# Patient Record
Sex: MALE | Race: WHITE | NOT HISPANIC OR LATINO | Employment: STUDENT | URBAN - METROPOLITAN AREA
[De-identification: names, ages, dates, MRNs, and addresses within clinical notes are randomized per-mention and may not be internally consistent; named-entity substitution may affect disease eponyms.]

---
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
## 2023-02-28 ENCOUNTER — TELEPHONE (OUTPATIENT)
Age: 18
End: 2023-02-28

## 2023-03-29 NOTE — TELEPHONE ENCOUNTER
03/29/23 10:57 AM     The office's request has been received, reviewed, and the patient chart updated  The PCP has successfully been removed with a patient attribution note  This message will now be completed      Thank you  Sonido Browne

## 2023-07-26 NOTE — CONSULTS
I had the pleasure of evaluating your patient, Lacey Double  My full evaluation follows:      Chief Complaint  Cyclic vomiting syndrome      History of Present Illness  Ronni Blanca was seen today in follow-up in the GI office regarding cyclic vomiting syndrome  Since last visit we perform some metabolic studies that were negative  An upper GI revealed some gastroesophageal reflux but no anatomic defects were noted  Since then, he has been taking Co Q10 on a daily basis and had no episodes over the summer  After school began, he has had 3 episodes and missed 6 days of school so far, more than I am comfortable with  The episodes are not as protracted nor as severe as they were prior to the initiation of Co Q10  Review of Systems    Constitutional: recent 3 lb weight gain, but not feeling poorly and not feeling tired  ENT: no nosebleeds and no nasal discharge  Cardiovascular: no chest pain and no palpitations  Respiratory: no cough and no wheezing  Gastrointestinal: vomiting and 3 episodes of vomiting since school has started, but no abdominal pain, no nausea, no constipation and no diarrhea  Genitourinary: no dysuria  Musculoskeletal: no arthralgias  Integumentary: no rashes  Neurological: no headache  ROS reviewed  Active Problems    1  Abnormal hearing test (389 9) (Z01 118,R94 128)   2  Abnormal thyroid stimulating hormone (TSH) level (790 6) (R94 6)   3  Cyclical vomiting with nausea, intractability of vomiting not specified (536 2) (G43  A0)   4  Intermittent abdominal pain (789 00) (R10 9)   5  Intermittent diarrhea (787 91) (R19 7)   6  Juvenile idiopathic scoliosis of thoracolumbar region (737 30) (M41 115)   7  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)   8   Need for influenza vaccination (V04 81) (Z23)    Past Medical History    · History of Acute maxillary sinusitis, recurrence not specified (461 0) (J01 00)   · History of Acute right otitis media (382 9) (H66 91)   · History of Closed Plastic Surgery Daily Progress Note  Patient name: Trell Puente  Patient age: 19 year old  MRN: [id number]  YOB: 2003  Admit date: 7/18/2023  Current Hospital Day: 9      Patient seen and examined. Laying comfortably in bed, denies any pain or nausea. Normal BM.    No events overnight.  No complaints.  No CP/SOB.  Pain controlled.    Temp:  [98.1 °F (36.7 °C)-98.8 °F (37.1 °C)] 98.2 °F (36.8 °C)  Heart Rate:  [72-86] 84  Resp:  [12-18] 12  BP: (106-118)/(61-73) 106/66      Intake/Output:  Date 07/25/23 0700 - 07/26/23 0659 07/26/23 0700 - 07/27/23 0659   Shift 3948-6923 2062-5871 5215-0443 24 Hour Total 3592-4262 3255-5619 8017-3554 24 Hour Total   INTAKE   Shift Total           OUTPUT   Urine 750 7030 369 9753       Drains 110 80 50 240 50   50     Output (ml) (Drain 07/24/23 Left Bulb (e.g. KRISHNA, Davol, etc)) 110 80 50 240 50   50   Shift Total 860 5313 282 3263 50   50   Weight (kg) 98.7 98.7 98.7 98.7 98.7 98.7 98.7 98.7          Exam:  General: NAD  Chest: Non labored breathing  Abdomen: Soft, NT  Extremities: LLE wrapped in ACE wrap, c/d/i   Wound vac with appropriate seal  KRISHNA drain x1 ssg    Medications:   Current Facility-Administered Medications   Medication Dose Route Frequency Provider Last Rate Last Admin   • enoxaparin (LOVENOX) injection 30 mg  30 mg Subcutaneous Q12H Everardo Moreno MD   30 mg at 07/26/23 1055   • ceFEPIme (MAXIPIME) 2,000 mg in sodium chloride 0.9 % 100 mL IVPB  2,000 mg Intravenous 3 times per day Gabo Miguel MD 25 mL/hr at 07/26/23 1332 2,000 mg at 07/26/23 1332   • cholecalciferol (VITAMIN D) tablet 4,000 Units  4,000 Units Oral Daily Jhonny Ricci MD   4,000 Units at 07/26/23 0825   • acetaminophen (TYLENOL) tablet 1,000 mg  1,000 mg Oral Q6H Eusebio Valentin DO   1,000 mg at 07/26/23 1329   • [Held by provider] losartan (COZAAR) tablet 25 mg  25 mg Oral Daily Eusebio Valentin DO   25 mg at 07/21/23 0909   • [Held by provider] spironolactone (ALDACTONE)  nondisplaced fracture of proximal phalanx of lesser toe of right foot,  initial encounter (826 0) (S92 514A)   · History of Failure to thrive (child) (783 41) (R62 51)   · History of Flu vaccine need (V04 81) (Z23)   · History of constipation (V12 79) (Z87 19)   · 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)   · History of fever (V13 89) (Z57 494)   · History of lymphadenopathy (V13 89) (C82 073)   · History of lymphadenopathy (V13 89) (D32 083)   · History of molluscum contagiosum (V12 00) (Z86 19)   · History of nasal congestion (V12 69) (Z87 09)   · History of Need for influenza vaccination (V04 81) (Z23)   · History of Tonsil, abscess (475) (L19)    Surgical History    · History of Biopsy Of Soft Tissue Of The Neck   · History of Indwelling Catheter PICC Line   · History of Percutaneous Placement Of Gastrostomy Tube    The surgical history was reviewed and updated today  Family History    · Family history of No Significant Family History    · Family history of ulcerative colitis (V18 59) (Z83 79)    · Family history of cardiac disorder (V17 49) (Z82 49)   · Family history of malignant neoplasm of breast (V16 3) (Z80 3)    · FH: colon cancer (V16 0) (Z80 0)    · Family history of Diverticulosis   · Family history of lung cancer (V16 1) (Z80 1)    The family history was reviewed and updated today  Social History    · Currently in 9th grade   · Lives with parents   · Musical instrument   · No alcohol use   · Non-smoker (V49 89) (Z78 9)   · History of Recreational Activities Bicycling  The social history was reviewed and updated today  Current Meds   1  CoQ10 200 MG Oral Capsule; TAKE AS DIRECTED; Therapy: 03MNI2094 to (Last Rx:[id number]) Ordered   2  Mometasone Furoate 50 MCG/ACT Nasal Suspension (Nasonex); 2 sprays each nostril   once a day; Therapy: [id number] to (Last Rx:[id number])  Requested for: 26Eqd2948 Ordered    The medication list was reviewed and updated today  Allergies    1  tablet 12.5 mg  12.5 mg Oral Daily Eusebio Valentin DO   12.5 mg at 07/21/23 0909   • metoPROLOL succinate (TOPROL-XL) ER tablet 12.5 mg  12.5 mg Oral Nightly Eusebio Valentin DO   12.5 mg at 07/25/23 2036   • sodium chloride (PF) 0.9 % injection 2 mL  2 mL Intracatheter 2 times per day Charla Neal MD   2 mL at 07/26/23 0825       Labs:  Recent Labs     07/25/23  0513 07/26/23  0521   RBC 2.69* 2.57*   WBC 5.7 5.8   HCT 22.3* 22.2*   HGB 7.6* 7.5*              S/p L thigh I&D, L tibia I&D, wound vac exchange, 2 Days Post-Op    - discussed moving forward with free flap to LLE on Monday 7/31  - NPO after midnight 7/31  - hold lovenox the morning of 7/31  - continue abx  - activity per ortho  - all questions answered    Joaquina Gibson PA-C  Plastic Surgery Physician Assistant  [facility]    Patient seen and examined with Dr. Neo Bey.     Blood Sets MISC   2  Tegaderm MISC    3  Other    Vitals   Recorded: 30UIP9961 04:09PM   Temperature 96 3 F, Tympanic   Systolic 98   Diastolic 60   Height 053 cm   Weight 45 1 kg   BMI Calculated 17 18   BSA Calculated 1 45   BMI Percentile 17 %   2-20 Stature Percentile 36 %   2-20 Weight Percentile 23 %     Physical Exam    Constitutional - General appearance: No acute distress, well appearing and well nourished  Pulmonary - Respiratory effort: Normal respiratory rate and rhythm, no increased work of breathing  Auscultation of lungs: Clear bilaterally  Cardiovascular - Auscultation of heart: Regular rate and rhythm, normal S1 and S2, no murmur  Chest - Chest: Normal    Abdomen - Abdomen: Normal bowel sounds, soft, non-tender, no masses  Liver and spleen: No hepatomegaly or splenomegaly  Results/Data    Results   Other urine organic acids, amino acids, carnitine were normal  Upper GI series revealed some reflux  Assessment    1  Cyclical vomiting with nausea, intractability of vomiting not specified (536 2) (G43  A0)    Plan  Abnormal hearing test, Cyclical vomiting with nausea, intractability of vomiting not  specified    · Follow-up visit in 2 months Evaluation and Treatment  Follow-up  Status: Hold For -  Scheduling  Requested for: [id number]   Ordered; For: Abnormal hearing test, Cyclical vomiting with nausea, intractability of vomiting not specified; Ordered By: Niecy Thibodeaux Performed:  Due: 06AIS3928  Cyclical vomiting with nausea, intractability of vomiting not specified    · Cyproheptadine HCl - 4 MG Oral Tablet; TAKE 1 TABLET AT BEDTIME   Rx By: Niecy Thibodeaux; Dispense: 30 Days ; #:30 Tablet;  Refill: 3; For: Cyclical vomiting with nausea, intractability of vomiting not specified; KIRSTEN = N; Sent To: [facility]  Cyclical vomiting with nausea, intractability of vomiting not specified, Intermittent  abdominal pain    · CoQ10 200 MG Oral Capsule; TAKE AS DIRECTED   Rx By:

## 2023-08-27 PROBLEM — E03.9 ACQUIRED HYPOTHYROIDISM: Status: ACTIVE | Noted: 2020-09-11

## 2023-08-27 RX ORDER — CLONAZEPAM 0.25 MG/1
TABLET, ORALLY DISINTEGRATING ORAL
Qty: 60 TABLET | Refills: 0 | Status: CANCELLED
Start: 2023-08-27

## 2023-09-01 ENCOUNTER — OFFICE VISIT (OUTPATIENT)
Dept: FAMILY MEDICINE CLINIC | Facility: CLINIC | Age: 18
End: 2023-09-01
Payer: COMMERCIAL

## 2023-09-01 VITALS
TEMPERATURE: 97.6 F | HEART RATE: 75 BPM | RESPIRATION RATE: 14 BRPM | DIASTOLIC BLOOD PRESSURE: 70 MMHG | SYSTOLIC BLOOD PRESSURE: 116 MMHG | HEIGHT: 65 IN | BODY MASS INDEX: 23.72 KG/M2 | OXYGEN SATURATION: 99 % | WEIGHT: 142.38 LBS

## 2023-09-01 DIAGNOSIS — R11.15 NON-INTRACTABLE CYCLICAL VOMITING WITH NAUSEA: ICD-10-CM

## 2023-09-01 DIAGNOSIS — G43.009 MIGRAINE WITHOUT AURA AND WITHOUT STATUS MIGRAINOSUS, NOT INTRACTABLE: ICD-10-CM

## 2023-09-01 DIAGNOSIS — E07.9 THYROID DISORDER: Primary | ICD-10-CM

## 2023-09-01 DIAGNOSIS — Z85.830 HISTORY OF EWING'S SARCOMA: ICD-10-CM

## 2023-09-01 DIAGNOSIS — R53.83 OTHER FATIGUE: ICD-10-CM

## 2023-09-01 DIAGNOSIS — Z13.89 SCREENING FOR CARDIOVASCULAR, RESPIRATORY, AND GENITOURINARY DISEASES: ICD-10-CM

## 2023-09-01 DIAGNOSIS — Z13.1 SCREENING FOR DIABETES MELLITUS (DM): ICD-10-CM

## 2023-09-01 DIAGNOSIS — Z13.83 SCREENING FOR CARDIOVASCULAR, RESPIRATORY, AND GENITOURINARY DISEASES: ICD-10-CM

## 2023-09-01 DIAGNOSIS — Z13.6 SCREENING FOR CARDIOVASCULAR, RESPIRATORY, AND GENITOURINARY DISEASES: ICD-10-CM

## 2023-09-01 PROBLEM — C41.9: Status: ACTIVE | Noted: 2023-09-01

## 2023-09-01 PROBLEM — R79.89 ABNORMAL TSH: Status: RESOLVED | Noted: 2020-09-11 | Resolved: 2023-09-01

## 2023-09-01 PROBLEM — R09.82 POST-NASAL DRIP: Status: RESOLVED | Noted: 2020-12-21 | Resolved: 2023-09-01

## 2023-09-01 PROBLEM — Z00.129 ENCOUNTER FOR WELL CHILD VISIT AT 17 YEARS OF AGE: Status: RESOLVED | Noted: 2020-12-21 | Resolved: 2023-09-01

## 2023-09-01 PROBLEM — F41.9 ANXIETY: Status: RESOLVED | Noted: 2018-01-26 | Resolved: 2023-09-01

## 2023-09-01 PROCEDURE — 99204 OFFICE O/P NEW MOD 45 MIN: CPT | Performed by: FAMILY MEDICINE

## 2023-09-01 NOTE — PROGRESS NOTES
Assessment/Plan:    No problem-specific Assessment & Plan notes found for this encounter. Fatigue, hx of presumed hypothyroidism  Check tsh, t3, tpo  Advised medication and monitoring if hypothyroidism but refer if hyperthyroidism    Hx ewings sarcoma and right TM deformity  Surveillance at [facility]  Could offer local oncology f/u since adult    Migraine stable  Can call for imitrex NS prn  zofran prn nausea  Prophylaxis if needed based on frequency     Diagnoses and all orders for this visit:    Thyroid disorder  -     TSH, 3rd generation; Future  -     T4, free; Future  -     Anti-microsomal antibody; Future  -     TSH, 3rd generation  -     T4, free  -     Anti-microsomal antibody    Non-intractable cyclical vomiting with nausea  Comments:  related to migraine    Screening for cardiovascular, respiratory, and genitourinary diseases  -     Lipid Panel with Direct LDL reflex; Future  -     Lipid Panel with Direct LDL reflex    Screening for diabetes mellitus (DM)  -     Comprehensive metabolic panel; Future  -     Comprehensive metabolic panel    Other fatigue  -     CBC and differential; Future  -     CBC and differential    History of Shahid's sarcoma    Migraine without aura and without status migrainosus, not intractable        No follow-ups on file. Subjective:      Patient ID: Dominique Luna is a 21 y.o. male.     Chief Complaint   Patient presents with   • Establish Care     Lw cma   • blood work     For thyroid low energy lw cma       HPI  ewings sarcoma age 4, chemo and radiation  Right jaw area  Had radiation  Follows at [facility]    Migraine hx in high school, neurology in past, prn meds helped, no daily meds  Got better when moved to [place]  zofran tolerated in past prn nausea    No ativan or klonopin anymore, was used for chemo related anxiety    No meds currently    high tsh in past  Saw endo locally at that time  Given medication  Age 10 at time  Stopped meds on own when moved  Lately notes fatigue    No fam of thyroid issues  F has DM2  M trigeminal neuralgia    The following portions of the patient's history were reviewed and updated as appropriate: allergies, current medications, past family history, past medical history, past social history, past surgical history and problem list.    Review of Systems   Constitutional: Positive for fatigue. Negative for chills, fever and unexpected weight change. Current Outpatient Medications   Medication Sig Dispense Refill   • SUMAtriptan (IMITREX) 20 MG/ACT nasal spray 1 spray (20 mg total) into each nostril every 2 (two) hours as needed for migraine Each spray 5 mg (Patient not taking: Reported on 4/9/2021) 1 Inhaler 1     No current facility-administered medications for this visit. Objective:    /70   Pulse 75   Temp 97.6 °F (36.4 °C) (Tympanic)   Resp 14   Ht 5' 5.25" (1.657 m)   Wt 64.6 kg (142 lb 6 oz)   SpO2 99%   BMI 23.51 kg/m²        Physical Exam  Vitals and nursing note reviewed. Constitutional:       General: He is not in acute distress. Appearance: He is well-developed. He is not ill-appearing. HENT:      Head: Normocephalic. Right Ear: External ear normal.      Left Ear: Tympanic membrane and external ear normal.      Ears:      Comments: Right TM atypical  Eyes:      General: No scleral icterus. Conjunctiva/sclera: Conjunctivae normal.   Neck:      Thyroid: No thyromegaly. Cardiovascular:      Rate and Rhythm: Normal rate and regular rhythm. Heart sounds: No murmur heard. Pulmonary:      Effort: Pulmonary effort is normal. No respiratory distress. Breath sounds: No wheezing. Abdominal:      General: There is no distension. Palpations: Abdomen is soft. Tenderness: There is no abdominal tenderness. Musculoskeletal:         General: No deformity. Cervical back: Neck supple. No rigidity or tenderness. Right lower leg: No edema. Left lower leg: No edema.    Lymphadenopathy: Cervical: No cervical adenopathy. Skin:     General: Skin is warm and dry. Coloration: Skin is not pale. Neurological:      Mental Status: He is alert. Motor: No weakness. Gait: Gait normal.   Psychiatric:         Mood and Affect: Mood normal.         Behavior: Behavior normal.         Thought Content:  Thought content normal.           Sophie Cabello DO

## 2023-09-06 LAB
ALBUMIN SERPL-MCNC: 5 G/DL (ref 4.3–5.2)
ALBUMIN/GLOB SERPL: 2.2 {RATIO} (ref 1.2–2.2)
ALP SERPL-CCNC: 96 IU/L (ref 51–125)
ALT SERPL-CCNC: 20 IU/L (ref 0–44)
AST SERPL-CCNC: 15 IU/L (ref 0–40)
BASOPHILS # BLD AUTO: 0 X10E3/UL (ref 0–0.2)
BASOPHILS NFR BLD AUTO: 1 %
BILIRUB SERPL-MCNC: 0.4 MG/DL (ref 0–1.2)
BUN SERPL-MCNC: 13 MG/DL (ref 6–20)
BUN/CREAT SERPL: 13 (ref 9–20)
CALCIUM SERPL-MCNC: 10.1 MG/DL (ref 8.7–10.2)
CHLORIDE SERPL-SCNC: 100 MMOL/L (ref 96–106)
CHOLEST SERPL-MCNC: 222 MG/DL (ref 100–199)
CO2 SERPL-SCNC: 25 MMOL/L (ref 20–29)
CREAT SERPL-MCNC: 1.03 MG/DL (ref 0.76–1.27)
EGFR: 107 ML/MIN/1.73
EOSINOPHIL # BLD AUTO: 0.4 X10E3/UL (ref 0–0.4)
EOSINOPHIL NFR BLD AUTO: 5 %
ERYTHROCYTE [DISTWIDTH] IN BLOOD BY AUTOMATED COUNT: 13 % (ref 11.6–15.4)
GLOBULIN SER-MCNC: 2.3 G/DL (ref 1.5–4.5)
GLUCOSE SERPL-MCNC: 86 MG/DL (ref 70–99)
HCT VFR BLD AUTO: 44.9 % (ref 37.5–51)
HDLC SERPL-MCNC: 51 MG/DL
HGB BLD-MCNC: 15.4 G/DL (ref 13–17.7)
IMM GRANULOCYTES # BLD: 0 X10E3/UL (ref 0–0.1)
IMM GRANULOCYTES NFR BLD: 0 %
LDLC SERPL CALC-MCNC: 142 MG/DL (ref 0–99)
LDLC/HDLC SERPL: 2.8 RATIO (ref 0–3.6)
LYMPHOCYTES # BLD AUTO: 2.2 X10E3/UL (ref 0.7–3.1)
LYMPHOCYTES NFR BLD AUTO: 30 %
MCH RBC QN AUTO: 31 PG (ref 26.6–33)
MCHC RBC AUTO-ENTMCNC: 34.3 G/DL (ref 31.5–35.7)
MCV RBC AUTO: 91 FL (ref 79–97)
MICRODELETION SYND BLD/T FISH: NORMAL
MONOCYTES # BLD AUTO: 0.6 X10E3/UL (ref 0.1–0.9)
MONOCYTES NFR BLD AUTO: 8 %
NEUTROPHILS # BLD AUTO: 4.2 X10E3/UL (ref 1.4–7)
NEUTROPHILS NFR BLD AUTO: 56 %
PLATELET # BLD AUTO: 402 X10E3/UL (ref 150–450)
POTASSIUM SERPL-SCNC: 4.4 MMOL/L (ref 3.5–5.2)
PROT SERPL-MCNC: 7.3 G/DL (ref 6–8.5)
RBC # BLD AUTO: 4.96 X10E6/UL (ref 4.14–5.8)
SL AMB VLDL CHOLESTEROL CALC: 29 MG/DL (ref 5–40)
SODIUM SERPL-SCNC: 139 MMOL/L (ref 134–144)
T4 FREE SERPL-MCNC: 1.2 NG/DL (ref 0.82–1.77)
THYROPEROXIDASE AB SERPL-ACNC: <9 IU/ML (ref 0–34)
TRIGL SERPL-MCNC: 162 MG/DL (ref 0–149)
TSH SERPL DL<=0.005 MIU/L-ACNC: 10.6 UIU/ML (ref 0.45–4.5)
WBC # BLD AUTO: 7.4 X10E3/UL (ref 3.4–10.8)

## 2023-09-07 PROBLEM — E03.8 OTHER SPECIFIED HYPOTHYROIDISM: Status: ACTIVE | Noted: 2023-09-01

## 2023-09-15 ENCOUNTER — OFFICE VISIT (OUTPATIENT)
Dept: FAMILY MEDICINE CLINIC | Facility: CLINIC | Age: 18
End: 2023-09-15
Payer: COMMERCIAL

## 2023-09-15 VITALS
WEIGHT: 138.38 LBS | HEIGHT: 65 IN | TEMPERATURE: 97.2 F | HEART RATE: 69 BPM | BODY MASS INDEX: 23.06 KG/M2 | SYSTOLIC BLOOD PRESSURE: 122 MMHG | DIASTOLIC BLOOD PRESSURE: 70 MMHG | RESPIRATION RATE: 14 BRPM

## 2023-09-15 DIAGNOSIS — Z85.830 HISTORY OF EWING'S SARCOMA: ICD-10-CM

## 2023-09-15 DIAGNOSIS — R53.83 OTHER FATIGUE: ICD-10-CM

## 2023-09-15 DIAGNOSIS — G43.009 MIGRAINE WITHOUT AURA AND WITHOUT STATUS MIGRAINOSUS, NOT INTRACTABLE: ICD-10-CM

## 2023-09-15 DIAGNOSIS — E03.8 OTHER SPECIFIED HYPOTHYROIDISM: Primary | ICD-10-CM

## 2023-09-15 PROBLEM — E03.9 ACQUIRED HYPOTHYROIDISM: Status: RESOLVED | Noted: 2020-09-11 | Resolved: 2023-09-15

## 2023-09-15 PROCEDURE — 99214 OFFICE O/P EST MOD 30 MIN: CPT | Performed by: FAMILY MEDICINE

## 2023-09-15 RX ORDER — LEVOTHYROXINE SODIUM 0.03 MG/1
25 TABLET ORAL
Qty: 90 TABLET | Refills: 1 | Status: SHIPPED | OUTPATIENT
Start: 2023-09-15

## 2023-09-15 NOTE — PROGRESS NOTES
Assessment/Plan:    No problem-specific Assessment & Plan notes found for this encounter. Start LT4 tx  TPO neg  Subclinical hypothyroid but TSH 10  will tx to see if helps fatigue if willing but if no effect and euthyroid, could contact me to dc and just monitor    Migraines stable     F/u 6m     Diagnoses and all orders for this visit:    Other specified hypothyroidism  -     levothyroxine (Euthyrox) 25 mcg tablet; Take 1 tablet (25 mcg total) by mouth daily in the early morning  -     TSH, 3rd generation; Future  -     T4, free; Future  -     TSH, 3rd generation  -     T4, free    Migraine without aura and without status migrainosus, not intractable    Other fatigue    History of Shahid's sarcoma        Return in about 6 months (around 3/15/2024) for Recheck. Subjective:      Patient ID: Sebas Peña is a 19 y.o. male. Chief Complaint   Patient presents with   • Medication Management     Discuss new medication for thyroid lw cma       HPI  Does have fatigue   Labs reviewed  Willing to start LT4 and will be compliant  May have been on 50mcg/d in past  Risks of under/over tx advised and interval of monitoring for safety    The following portions of the patient's history were reviewed and updated as appropriate: allergies, current medications, past family history, past medical history, past social history, past surgical history and problem list.    Review of Systems   Constitutional: Positive for fatigue. Neurological: Positive for headaches. Current Outpatient Medications   Medication Sig Dispense Refill   • levothyroxine (Euthyrox) 25 mcg tablet Take 1 tablet (25 mcg total) by mouth daily in the early morning 90 tablet 1   • SUMAtriptan (IMITREX) 20 MG/ACT nasal spray 1 spray (20 mg total) into each nostril every 2 (two) hours as needed for migraine Each spray 5 mg 1 Inhaler 1     No current facility-administered medications for this visit.        Objective:    /70   Pulse 69   Temp Atiya Angles ) 97.2 °F (36.2 °C) (Tympanic)   Resp 14   Ht 5' 5.25" (1.657 m)   Wt 62.8 kg (138 lb 6 oz)   BMI 22.85 kg/m²        Physical Exam  Vitals and nursing note reviewed. Constitutional:       General: He is not in acute distress. Appearance: He is well-developed. He is not ill-appearing. HENT:      Head: Normocephalic. Left Ear: Tympanic membrane normal.      Ears:      Comments: Right TM irregular  Eyes:      General: No scleral icterus. Conjunctiva/sclera: Conjunctivae normal.   Cardiovascular:      Rate and Rhythm: Normal rate and regular rhythm. Pulmonary:      Effort: Pulmonary effort is normal. No respiratory distress. Abdominal:      Palpations: Abdomen is soft. Musculoskeletal:         General: No deformity. Cervical back: Neck supple. Skin:     General: Skin is warm and dry. Coloration: Skin is not pale. Neurological:      Mental Status: He is alert. Motor: No weakness. Gait: Gait normal.   Psychiatric:         Mood and Affect: Mood normal.         Behavior: Behavior normal.         Thought Content:  Thought content normal.                James Vogt,

## 2023-10-31 PROBLEM — Z13.83 SCREENING FOR CARDIOVASCULAR, RESPIRATORY, AND GENITOURINARY DISEASES: Status: RESOLVED | Noted: 2023-09-01 | Resolved: 2023-10-31

## 2023-10-31 PROBLEM — Z13.1 SCREENING FOR DIABETES MELLITUS (DM): Status: RESOLVED | Noted: 2023-09-01 | Resolved: 2023-10-31

## 2023-10-31 PROBLEM — Z13.6 SCREENING FOR CARDIOVASCULAR, RESPIRATORY, AND GENITOURINARY DISEASES: Status: RESOLVED | Noted: 2023-09-01 | Resolved: 2023-10-31

## 2023-10-31 PROBLEM — Z13.89 SCREENING FOR CARDIOVASCULAR, RESPIRATORY, AND GENITOURINARY DISEASES: Status: RESOLVED | Noted: 2023-09-01 | Resolved: 2023-10-31

## 2024-03-18 DIAGNOSIS — E03.8 OTHER SPECIFIED HYPOTHYROIDISM: ICD-10-CM

## 2024-03-18 RX ORDER — LEVOTHYROXINE SODIUM 0.03 MG/1
25 TABLET ORAL
Qty: 90 TABLET | Refills: 1 | Status: SHIPPED | OUTPATIENT
Start: 2024-03-18